# Patient Record
Sex: FEMALE | Race: BLACK OR AFRICAN AMERICAN
[De-identification: names, ages, dates, MRNs, and addresses within clinical notes are randomized per-mention and may not be internally consistent; named-entity substitution may affect disease eponyms.]

---

## 2018-09-12 ENCOUNTER — HOSPITAL ENCOUNTER (INPATIENT)
Dept: HOSPITAL 74 - JER | Age: 53
LOS: 1 days | Discharge: HOME | DRG: 58 | End: 2018-09-13
Attending: INTERNAL MEDICINE | Admitting: INTERNAL MEDICINE
Payer: COMMERCIAL

## 2018-09-12 VITALS — BODY MASS INDEX: 41.3 KG/M2

## 2018-09-12 DIAGNOSIS — I10: ICD-10-CM

## 2018-09-12 DIAGNOSIS — E66.9: ICD-10-CM

## 2018-09-12 DIAGNOSIS — R20.0: Primary | ICD-10-CM

## 2018-09-12 LAB
ALBUMIN SERPL-MCNC: 3.5 G/DL (ref 3.4–5)
ALP SERPL-CCNC: 52 U/L (ref 45–117)
ALT SERPL-CCNC: 35 U/L (ref 12–78)
ANION GAP SERPL CALC-SCNC: 5 MMOL/L (ref 8–16)
APPEARANCE UR: (no result)
AST SERPL-CCNC: 23 U/L (ref 15–37)
BASOPHILS # BLD: 0.9 % (ref 0–2)
BILIRUB SERPL-MCNC: 0.5 MG/DL (ref 0.2–1)
BILIRUB UR STRIP.AUTO-MCNC: NEGATIVE MG/DL
BUN SERPL-MCNC: 12 MG/DL (ref 7–18)
CALCIUM SERPL-MCNC: 8.8 MG/DL (ref 8.5–10.1)
CHLORIDE SERPL-SCNC: 107 MMOL/L (ref 98–107)
CHOLEST SERPL-MCNC: 176 MG/DL (ref 50–200)
CO2 SERPL-SCNC: 30 MMOL/L (ref 21–32)
COLOR UR: (no result)
CREAT SERPL-MCNC: 0.7 MG/DL (ref 0.55–1.02)
DEPRECATED RDW RBC AUTO: 14 % (ref 11.6–15.6)
EOSINOPHIL # BLD: 0.6 % (ref 0–4.5)
GLUCOSE SERPL-MCNC: 100 MG/DL (ref 74–106)
HCT VFR BLD CALC: 37.2 % (ref 32.4–45.2)
HDLC SERPL-MCNC: 39 MG/DL (ref 40–60)
HGB BLD-MCNC: 12.4 GM/DL (ref 10.7–15.3)
INR BLD: 1.12 (ref 0.83–1.09)
KETONES UR QL STRIP: NEGATIVE
LEUKOCYTE ESTERASE UR QL STRIP.AUTO: NEGATIVE
LYMPHOCYTES # BLD: 54 % (ref 8–40)
MCH RBC QN AUTO: 26.4 PG (ref 25.7–33.7)
MCHC RBC AUTO-ENTMCNC: 33.4 G/DL (ref 32–36)
MCV RBC: 78.9 FL (ref 80–96)
MONOCYTES # BLD AUTO: 6.8 % (ref 3.8–10.2)
NEUTROPHILS # BLD: 37.7 % (ref 42.8–82.8)
NITRITE UR QL STRIP: NEGATIVE
PH UR: 5 [PH] (ref 5–8)
PLATELET # BLD AUTO: 197 K/MM3 (ref 134–434)
PMV BLD: 7.8 FL (ref 7.5–11.1)
POTASSIUM SERPLBLD-SCNC: 3.8 MMOL/L (ref 3.5–5.1)
PROT SERPL-MCNC: 7.8 G/DL (ref 6.4–8.2)
PROT UR QL STRIP: NEGATIVE
PROT UR QL STRIP: NEGATIVE
PT PNL PPP: 12.7 SEC (ref 9.7–13)
RBC # BLD AUTO: 4.72 M/MM3 (ref 3.6–5.2)
SODIUM SERPL-SCNC: 142 MMOL/L (ref 136–145)
SP GR UR: 1.01 (ref 1–1.03)
TRIGL SERPL-MCNC: 283 MG/DL (ref 35–160)
UROBILINOGEN UR STRIP-MCNC: NEGATIVE MG/DL (ref 0.2–1)
WBC # BLD AUTO: 9.6 K/MM3 (ref 4–10)

## 2018-09-12 RX ADMIN — AMLODIPINE BESYLATE SCH MG: 10 TABLET ORAL at 23:30

## 2018-09-12 NOTE — PDOC
History of Present Illness





- General


Chief Complaint: Blood Pressure Problem


Stated Complaint: NUMBNESS LEFT SIDE FACE SINCE 9/11/18


Time Seen by Provider: 09/12/18 12:53





- History of Present Illness


Initial Comments: 





09/12/18 13:34


The patient is a 53 year old female, with a significant past medical history of 

HTN (not on medication), who presents to the emergency department with 2 days 

of constant left facial and intermittent L arm numbness. She states reports her 

blood pressure 2 days ago was 202/136 when she was visiting her family in 

[place]. She reportedly drove to [place] and back, but admits to taking stops 

and walking around on route. She reports the facial numbness is more prominent 

to her left forehead with slightly less decrease in sensation to her left cheek 

and jaw. She reports her left arm has pins and needles from her wrist to her 

anterior elbow. She denies any tingling now and reports the tingling comes and 

goes. Secondarily, she states she felt a momentary pressure-like headache at 

onset yesterday which has resolved at this time. 





The patient denies chest pain, shortness of breath, and dizziness. She denies 

visual, auditory, or speech changes. The patient denies fever, chills, nausea, 

vomit, diarrhea and constipation. The patient denies dysuria, frequency, 

urgency and hematuria. 





Allergies: NKDA


Past surgical history: tubal ligation


Social history: occasional ETOH. prior tobacco smoker (26 yrs ago)


Family Hx: CAD (mother), CHF (grandmother)


PCP - Dr. Arias? ([place])








Past History





- Past Medical History


Allergies/Adverse Reactions: 


 Allergies











Allergy/AdvReac Type Severity Reaction Status Date / Time


 


Penicillins Allergy   Verified 09/12/18 12:04











Home Medications: 


Ambulatory Orders





NK [No Known Home Medication]  09/12/18 








Cardiac Disorders: Yes (10% blockage)


CVA: No


COPD: No


HTN: Yes





- Surgical History


Abdominal Surgery: Yes (tubal ligation)





- Suicide/Smoking/Psychosocial Hx


Smoking History: Never smoked


Information on smoking cessation initiated: No


Hx Alcohol Use: No


Drug/Substance Use Hx: No


Substance Use Type: None





**Review of Systems





- Review of Systems


Comments:: 





09/12/18 13:30


GENERAL/CONSTITUTIONAL: No fever or chills. No weakness.


HEAD, EYES, EARS, NOSE AND THROAT: No change in vision. No ear pain or 

discharge. No sore throat.


GASTROINTESTINAL: No nausea, vomiting, diarrhea or constipation.


GENITOURINARY: No dysuria, frequency, or change in urination.


CARDIOVASCULAR: No chest pain or shortness of breath.


RESPIRATORY: No cough, wheezing, or hemoptysis.


MUSCULOSKELETAL: No joint or muscle swelling or pain. No neck or back pain.


SKIN: No rash


NEUROLOGIC: +headache, +L facial and LUE weakness, no vertigo, loss of 

consciousness, 


ENDOCRINE: No increased thirst. No abnormal weight change.


HEMATOLOGIC/LYMPHATIC: No anemia, easy bleeding, or history of blood clots.


ALLERGIC/IMMUNOLOGIC: No hives or skin allergy.





*Physical Exam





- Vital Signs


 Last Vital Signs











Temp Pulse Resp BP Pulse Ox


 


 98.2 F   74   19   195/105   99 


 


 09/12/18 12:01  09/12/18 12:01  09/12/18 12:01  09/12/18 12:01  09/12/18 12:01














- Physical Exam


Comments: 





09/12/18 13:33


GENERAL: Awake, alert, and fully oriented, in no acute distress


HEAD: No signs of trauma


EYES: PERRLA, EOMI, sclera anicteric, conjunctiva clear


ENT: Auricles normal inspection, hearing grossly normal, nares patent, 

oropharynx clear without exudates. Moist mucosa


NECK: Normal ROM, supple, no lymphadenopathy, JVD, or masses


LUNGS: Breath sounds equal, clear to auscultation bilaterally.  No wheezes, and 

no crackles


HEART: Regular rate and rhythm, normal S1 and S2, no murmurs, rubs or gallops


ABDOMEN: Soft, nontender, normoactive bowel sounds.  No guarding, no rebound.  

No masses


EXTREMITIES: Normal range of motion, no edema.  No clubbing or cyanosis. No 

cords, erythema, or tenderness


NEUROLOGICAL:  Normal speech, decreased L facial sensation to light touch, 

negative pronator drift, 5/5 strength in all 4 extremities, normal sensation to 

light touch in all 4 extremities, normal cerebellar exam, normal gait, normal 

reflexes and tone


SKIN: Warm, Dry, normal turgor, no rashes or lesions noted.





**Heart Score/ECG Review


  ** #1





09/12/18 13:24


Twelve-lead EKG was performed and reviewed by me. Normal sinus rhythm, rate 64. 

Normal axis and intervals. No ST elevations. T wave inversions in leads 1, 2, 3

, aVF, V5, V6. T-wave flattening in aVL. No previous EKG to compare.





ED Treatment Course





- LABORATORY


CBC & Chemistry Diagram: 


 09/12/18 14:14





 09/12/18 14:14





Medical Decision Making





- Medical Decision Making





09/12/18 13:25


53-year-old female with a history of hypertension, but not on any medications 

presents emergency Department with left facial numbness since last night as 

well as left upper extremity numbness intermittently since yesterday. Patient's 

blood pressures elevated in the ED to 195/105. On exam has decreased sensation 

over V1 and V2 distributions of the trigeminal nerve. Remainder of neuro exam 

is intact. Differential includes CVA versus TIA versus hypertensive emergency. 

Plan


-labs


-CTH


-neuro c/s


-will allow permissive HTN in case of CVA


-admit





09/12/18 15:27


Labs neg


CTH neg


MRI ordered, neuro c/s awaiting call back


Pt updated





09/12/18 16:38


Case discussed with Dr. Montero who recommends hydralzine to get BP down by 20-

25%


Case discussed with Dr. Boyd, pt accepted for admission





Case discussed in detail with admitting physician including history, physical 

exam and ancillary studies.





Admitting physician has assumed care for the patient, will follow all pending 

diagnostics and will complete the evaluation and treatment.  








*DC/Admit/Observation/Transfer


Diagnosis at time of Disposition: 


 Left facial numbness, Hypertension








- Discharge Dispostion


Condition at time of disposition: Stable


Decision to Admit order: Yes





- Referrals





- Patient Instructions





- Post Discharge Activity





- Attestations


Physician Attestion: 





09/12/18 15:29








I, Dr. Jayce Tidwell MD, attest that this document has been prepared under my 

direction and personally reviewed by me in its entirety.   I further attest, 

that it accurately reflects all work, treatment, procedures and medical decision

-making performed by me.

## 2018-09-13 VITALS — SYSTOLIC BLOOD PRESSURE: 135 MMHG | TEMPERATURE: 98 F | DIASTOLIC BLOOD PRESSURE: 75 MMHG

## 2018-09-13 VITALS — HEART RATE: 68 BPM

## 2018-09-13 LAB
ALBUMIN SERPL-MCNC: 3.4 G/DL (ref 3.4–5)
ALP SERPL-CCNC: 54 U/L (ref 45–117)
ALT SERPL-CCNC: 31 U/L (ref 13–61)
ANION GAP SERPL CALC-SCNC: 6 MMOL/L (ref 8–16)
ANISOCYTOSIS BLD QL: (no result)
AST SERPL-CCNC: 23 U/L (ref 15–37)
BASOPHILS # BLD: 0.4 % (ref 0–2)
BILIRUB SERPL-MCNC: 0.6 MG/DL (ref 0.2–1)
BUN SERPL-MCNC: 12 MG/DL (ref 7–18)
CALCIUM SERPL-MCNC: 8.7 MG/DL (ref 8.5–10.1)
CHLORIDE SERPL-SCNC: 105 MMOL/L (ref 98–107)
CO2 SERPL-SCNC: 30 MMOL/L (ref 21–32)
CREAT SERPL-MCNC: 0.8 MG/DL (ref 0.55–1.02)
DEPRECATED RDW RBC AUTO: 14.2 % (ref 11.6–15.6)
EOSINOPHIL # BLD: 1 % (ref 0–4.5)
GLUCOSE SERPL-MCNC: 102 MG/DL (ref 74–106)
HCT VFR BLD CALC: 38.1 % (ref 32.4–45.2)
HGB BLD-MCNC: 12.6 GM/DL (ref 10.7–15.3)
LYMPHOCYTES # BLD: 55.7 % (ref 8–40)
MACROCYTES BLD QL: 0
MCH RBC QN AUTO: 26 PG (ref 25.7–33.7)
MCHC RBC AUTO-ENTMCNC: 33.1 G/DL (ref 32–36)
MCV RBC: 78.4 FL (ref 80–96)
MONOCYTES # BLD AUTO: 8 % (ref 3.8–10.2)
NEUTROPHILS # BLD: 34.9 % (ref 42.8–82.8)
PLATELET # BLD AUTO: 188 K/MM3 (ref 134–434)
PLATELET BLD QL SMEAR: NORMAL
PMV BLD: 7.8 FL (ref 7.5–11.1)
POTASSIUM SERPLBLD-SCNC: 3.8 MMOL/L (ref 3.5–5.1)
PROT SERPL-MCNC: 7.7 G/DL (ref 6.4–8.2)
RBC # BLD AUTO: 4.85 M/MM3 (ref 3.6–5.2)
SODIUM SERPL-SCNC: 141 MMOL/L (ref 136–145)
WBC # BLD AUTO: 9.2 K/MM3 (ref 4–10)

## 2018-09-13 RX ADMIN — AMLODIPINE BESYLATE SCH MG: 10 TABLET ORAL at 09:30

## 2018-09-13 NOTE — CON.CARD
Consult


Consult Specialty:: Cardiology


Referred by:: Dr. Robledo


Reason for Consultation:: Severe hypertension





- History of Present Illness


Chief Complaint: Severe hypertension with facial numbness


History of Present Illness: 


53-year-old obese woman with a PMHx of hypertension, but not on medications 

admitted 9/12/2018 with severe hypertension. 





The patient presented to emergency department 9/12/2018 with left facial 

numbness as well as left upper extremity numbness intermittently for one day. 

Her BP  Patient's blood pressures elevated in the ED to 195/105. Her symptoms 

resolved after admission. CT-had and MRI 9/12/2018 were negative. 





ECG 9/12/2018 showed sinus with lateral T inversion. Echo 9/13/2018 revealed 

mild concentric LVH with normal systolic function. LVEF = 65-70%.  





She has been under stress due to family tragedies. But she has no symptoms of 

angina or CHF. Her exercise tolerance is very good. 








- History Source


History Provided By: Patient


Limitations to Obtaining History: No Limitations





- Past Medical History


...LMP Comment: post menopausal


...Pregnant: No





- Alcohol/Substance Use


Hx Alcohol Use: Yes (occasional glass of wine)





- Smoking History


Smoking history: Never smoked


Have you smoked in the past 12 months: No





Home Medications





- Allergies


Allergies/Adverse Reactions: 


 Allergies











Allergy/AdvReac Type Severity Reaction Status Date / Time


 


Penicillins Allergy   Verified 09/12/18 12:04














- Home Medications


Home Medications: 


Ambulatory Orders





Naproxen Sodium [Aleve] 220 mg PO PRN  mg 09/13/18 











Review of Systems





- Review of Systems


Constitutional: reports: No Symptoms


Eyes: reports: No Symptoms


HENT: reports: No Symptoms


Neck: reports: No Symptoms


Cardiovascular: reports: No Symptoms


Respiratory: reports: No Symptoms


Gastrointestinal: reports: No Symptoms


Genitourinary: reports: No Symptoms


Breasts: reports: No Symptoms Reported


Musculoskeletal: reports: No Symptoms


Integumentary: reports: No Symptoms


Neurological: reports: No Symptoms


Endocrine: reports: No Symptoms


Hematology/Lymphatic: reports: No Symptoms


Vital Signs: 


 Vital Signs











Temperature  98.4 F   09/13/18 14:00


 


Pulse Rate  68   09/13/18 14:00


 


Respiratory Rate  18   09/13/18 14:00


 


Blood Pressure  122/83   09/13/18 14:00


 


O2 Sat by Pulse Oximetry (%)  98   09/13/18 09:00








General:  Well developed. Obese. No acute distress. 


Head: Normocephalic. Atraumatic, 


Eyes: PERRLA, EOMI. Sclerae anicteric. Conjunctivae clear.


Neck: Supple. No JVD. No bruits. 


Heart: Normal S1, S2: Regularly regular rhythm and rate. No murmur. No gallop 

or rub. 


Lungs: Symmetrical air entry. Clear to auscultation. No crackle. No wheezing or 

rhonchi.  


Abdomen: Soft. Bowel sound positive. Non tender. No masses. 


Extremities: No edema. No clubbing or cyanosis. PD 2+, equal bilaterally.


Neuro: Intact, no focal findings. AAO X3.





- Other Data


Labs, Other Data: 


 CBC, BMP





 09/13/18 06:00 





 09/13/18 06:00 





 INR, PTT











INR  1.12  (0.83-1.09)  H  09/12/18  14:14    








 Troponin, BNP











  09/12/18





  01:00


 


Troponin I  < 0.02








 Troponin, BNP











  09/12/18





  01:00


 


Troponin I  < 0.02














Assessment/Plan


53-year-old obese woman with a PMHx of hypertension, but not on medications 

admitted 9/12/2018 with severe hypertension. 





CT-had and MRI 9/12/2018 were negative. 


ECG 9/12/2018 showed sinus with lateral T inversion. Echo 9/13/2018 revealed 

mild concentric LVH with normal systolic function. LVEF = 65-70%.  





Severe hypertension:


BP is well controlled with amlodipine 10 mg daily. Her heart rate is low normal 

range without arrhythmia. 





Continue amlodipine 10 mg daily with low salt diet. 


Out-patient cardiac follow up with Dr. Fowler. 





Please call us for reconsult as needed.

## 2018-09-13 NOTE — ECHO
______________________________________________________________________________



Name: SERENITY, HARI                                   Exam:Adult Echocardiogram

MRN: [id number]         Study Date: 2018 08:37 AM

Age: 53 yrs

______________________________________________________________________________



Reason For Study: htn

Height: 64 in        Weight: 240 lb        BSA: 2.1 m2



______________________________________________________________________________



MMode/2D Measurements & Calculations

IVSd: 1.2 cm                                           Ao root diam: 3.2 cm

LVIDd: 4.0 cm                                          LA dimension: 4.0 cm

LVIDs: 2.4 cm

LVPWd: 1.4 cm



________________________________________________________

LVPWs: 2.0 cm                                          EDV(Teich): 71.1 ml

                                                       ESV(Teich): 19.7 ml



________________________________________________________

RV S Niko: 8.5 cm/sec



Doppler Measurements & Calculations

MV E max niko: 81.0 cm/sec

MV A max niko: 77.1 cm/sec                           MV dec slope: 138.0 cm/sec2

MV E/A: 1.1

MV dec time: 0.21 sec



_____________________________________________________

Ao V2 max: 163.8 cm/sec                             LV V1 max P.4 mmHg

Ao max PG: 10.8 mmHg                                LV V1 max: 116.4 cm/sec



_____________________________________________________

PA V2 max: 104.5 cm/sec                             Med Peak E' Niko: 6.9 cm/sec

PA max P.4 mmHg                                 Med E/e': 11.8

                                                    Lat Peak E' Niko: 10.7 cm/sec

                                                    Lat E/e': 7.6





______________________________________________________________________________

Procedure

A complete two-dimensional transthoracic echocardiogram was performed (2D, M-mode, Doppler and color 
flow

Doppler).

Left Ventricle

There is mild concentric left ventricular hypertrophy. The left ventricular ejection fraction is norm
al.

Ejection Fraction = 65-70%. Left Ventricular Filling pattern is normal for age. The left ventricular 
wall

motion is normal.

Right Ventricle

The right ventricle is normal in size and function.

Atria

Normal left and right atrial size and function.

Mitral Valve

There is no mitral regurgitation noted.

Tricuspid Valve

No tricuspid regurgitation. There was insufficient TR detected to calculate RV systolic pressure.

Aortic Valve

No hemodynamically significant valvular aortic stenosis. No aortic regurgitation is present.

Pulmonic Valve

Trace pulmonic valvular regurgitation.

Great Vessels

The aortic root is normal size.

Pericardium/Pleura

There is no pericardial effusion.

______________________________________________________________________________





Interpretation Summary

There is mild concentric left ventricular hypertrophy.

The left ventricular ejection fraction is normal.

The right ventricle is normal in size and function.

Trace pulmonic valvular regurgitation.





MD Christophe Musa 2018 02:39 PM

## 2018-09-13 NOTE — EKG
Test Reason : 

Blood Pressure : ***/*** mmHG

Vent. Rate : 064 BPM     Atrial Rate : 064 BPM

   P-R Int : 168 ms          QRS Dur : 078 ms

    QT Int : 428 ms       P-R-T Axes : 033 011 -13 degrees

   QTc Int : 441 ms

 

NORMAL SINUS RHYTHM

NONSPECIFIC T WAVE ABNORMALITY

ABNORMAL ECG

NO PREVIOUS ECGS AVAILABLE

Confirmed by CHIQUITA GREEN, FELIZ ([id number]) on 9/13/2018 3:40:37 PM

 

Referred By:             Confirmed By:FELIZ PORRAS MD

## 2018-09-13 NOTE — DS
Physical Examination


Vital Signs: 


 Vital Signs











Temperature  98.0 F   09/13/18 18:00


 


Pulse Rate  68   09/13/18 18:00


 


Respiratory Rate  18   09/13/18 18:00


 


Blood Pressure  135/75   09/13/18 18:00


 


O2 Sat by Pulse Oximetry (%)  98   09/13/18 09:00











Constitutional: Yes: No Distress


HENT: Yes: Atraumatic


Neck: Yes: Supple


Cardiovascular: Yes: Regular Rate and Rhythm


Respiratory: Yes: CTA Bilaterally


Gastrointestinal: Yes: Normal Bowel Sounds


Extremities: Yes: WNL


Neurological: Yes: Alert, Oriented


Labs: 


 CBC, BMP





 09/13/18 06:00 





 09/13/18 06:00 











Discharge Summary


Reason For Visit: LEFT FACIAL NUMBNESS, HYPERTENSION


Current Active Problems





Hypertension (Acute)


Left facial numbness (Acute)








Condition: Stable





- Instructions


Referrals: 


Hunter Fowler MD [Staff Physician] - 





- Home Medications


Comprehensive Discharge Medication List: 


Ambulatory Orders





Amlodipine Besylate [Norvasc -] 10 mg PO DAILY #30 tablet 09/13/18 


Naproxen Sodium [Aleve] 220 mg PO PRN  mg 09/13/18 





dc home

## 2018-09-13 NOTE — CONSULT
Consult - text type





- Consultation


Consultation Note: 





 Neurology


History of Present Illness


53 year old female, with a significant past medical history of HTN (not on 

medication), who presents to the emergency department with 2 days of constant 

left facial and intermittent L arm numbness. She stated her blood pressure was 

recently elevated to 202/136 when she was visiting her family in [place]. She 

reportedly drove to [place] and back, but admited to taking stops and walking 

around on route. She reported the facial numbness was more prominent to her 

left forehead with slightly less decrease in sensation to her left cheek and 

jaw. She reports her left arm has pins and needles from her wrist to her 

anterior elbow. She completed CT head and did not show acute changes. MRI brain 

completed and reviewed and no acute changes noted. No symptoms this am and 

reports being at baseline. No deficits noted. 








Past History





- Past Medical History


Allergies/Adverse Reactions: 


 Allergies











Allergy/AdvReac Type Severity Reaction Status Date / Time


 


Penicillins Allergy   Verified 09/12/18 12:04











Home Medications: 


Ambulatory Orders





NK [No Known Home Medication]  09/12/18 








Cardiac Disorders: Yes (10% blockage)


CVA: No


COPD: No


HTN: Yes





- Surgical History


Abdominal Surgery: Yes (tubal ligation)





- Suicide/Smoking/Psychosocial Hx


Smoking History: Never smoked


Information on smoking cessation initiated: No


Hx Alcohol Use: No


Drug/Substance Use Hx: No


Substance Use Type: None





**Review of Systems


GENERAL/CONSTITUTIONAL: No fever or chills. No weakness.


HEAD, EYES, EARS, NOSE AND THROAT: No change in vision. No ear pain or 

discharge. No sore throat.


GASTROINTESTINAL: No nausea, vomiting, diarrhea or constipation.


GENITOURINARY: No dysuria, frequency, or change in urination.


CARDIOVASCULAR: No chest pain or shortness of breath.


RESPIRATORY: No cough, wheezing, or hemoptysis.


MUSCULOSKELETAL: No joint or muscle swelling or pain. No neck or back pain.


SKIN: No rash


NEUROLOGIC: +headache, +L facial and LUE weakness, no vertigo, loss of 

consciousness, 


ENDOCRINE: No increased thirst. No abnormal weight change.


HEMATOLOGIC/LYMPHATIC: No anemia, easy bleeding, or history of blood clots.


ALLERGIC/IMMUNOLOGIC: No hives or skin allergy.





*Physical Exam


 Vital Signs











Temperature  98.3 F   09/13/18 05:46


 


Pulse Rate  61   09/13/18 05:46


 


Respiratory Rate  20   09/13/18 05:46


 


Blood Pressure  142/96   09/13/18 05:46


 


O2 Sat by Pulse Oximetry (%)  98   09/13/18 00:10














09/12/18 13:33


GENERAL: Awake, alert, and fully oriented, in no acute distress


HEAD: No signs of trauma


EYES: PERRLA, EOMI, sclera anicteric, conjunctiva clear


ENT: Auricles normal inspection, hearing grossly normal, nares patent, 

oropharynx clear without exudates. Moist mucosa


NECK: Normal ROM, supple, no lymphadenopathy, JVD, or masses


LUNGS: Breath sounds equal, clear to auscultation bilaterally.  No wheezes, and 

no crackles


HEART: Regular rate and rhythm, normal S1 and S2, no murmurs, rubs or gallops


ABDOMEN: Soft, nontender, normoactive bowel sounds.  No guarding, no rebound.  

No masses


EXTREMITIES: Normal range of motion, no edema.  No clubbing or cyanosis. No 

cords, erythema, or tenderness


NEUROLOGICAL:  Normal speech, CN normal, negative pronator drift, 5/5 strength 

in all 4 extremities, normal sensation to light touch in all 4 extremities, 

normal cerebellar exam, normal gait, normal reflexes and tone


SKIN: Warm, Dry, normal turgor, no rashes or lesions noted.


 CBCD











WBC  9.2 K/mm3 (4.0-10.0)   09/13/18  06:00    


 


RBC  4.85 M/mm3 (3.60-5.2)   09/13/18  06:00    


 


Hgb  12.6 GM/dL (10.7-15.3)   09/13/18  06:00    


 


Hct  38.1 % (32.4-45.2)   09/13/18  06:00    


 


MCV  78.4 fl (80-96)  L  09/13/18  06:00    


 


MCHC  33.1 g/dl (32.0-36.0)   09/13/18  06:00    


 


RDW  14.2 % (11.6-15.6)   09/13/18  06:00    


 


Plt Count  188 K/MM3 (134-434)   09/13/18  06:00    


 


MPV  7.8 fl (7.5-11.1)   09/13/18  06:00    








 CMP











Sodium  141 mmol/L (136-145)   09/13/18  06:00    


 


Potassium  3.8 mmol/L (3.5-5.1)   09/13/18  06:00    


 


Chloride  105 mmol/L ()   09/13/18  06:00    


 


Carbon Dioxide  30 mmol/L (21-32)   09/13/18  06:00    


 


Anion Gap  6 MMOL/L (8-16)  L  09/13/18  06:00    


 


BUN  12 mg/dL (7-18)   09/13/18  06:00    


 


Creatinine  0.8 mg/dL (0.55-1.02)   09/13/18  06:00    


 


Creat Clearance w eGFR  > 60  (>60)   09/13/18  06:00    


 


Random Glucose  102 mg/dL ()   09/13/18  06:00    


 


Calcium  8.7 mg/dL (8.5-10.1)   09/13/18  06:00    


 


Total Bilirubin  0.6 mg/dL (0.2-1.0)   09/13/18  06:00    


 


AST  23 U/L (15-37)   09/13/18  06:00    


 


ALT  31 U/L (13-61)   09/13/18  06:00    


 


Alkaline Phosphatase  54 U/L ()   09/13/18  06:00    


 


Total Protein  7.7 g/dl (6.4-8.2)   09/13/18  06:00    


 


Albumin  3.4 g/dl (3.4-5.0)   09/13/18  06:00    








 CARDIAC ENZYMES











Creatine Kinase  48 IU/L ()   09/12/18  14:14    


 


Troponin I  < 0.02 ng/ml (0.00-0.05)   09/12/18  14:14    











CT head reviewed


MRI brain reviewed 





Medical Decision Making


53 year old female, with a significant past medical history of HTN (not on 

medication), who presents to the emergency department with 2 days of constant 

left facial and intermittent L arm numbness. She stated her blood pressure was 

recently elevated to 202/136 when she was visiting her family in [place]. She 

reportedly drove to [place] and back, but admited to taking stops and walking 

around on route. She reported the facial numbness was more prominent to her 

left forehead with slightly less decrease in sensation to her left cheek and 

jaw. She reports her left arm has pins and needles from her wrist to her 

anterior elbow. She completed CT head and did not show acute changes. MRI brain 

completed and reviewed and no acute changes noted. No symptoms this am and 

reports being at baseline. No deficits noted. Blood pressure optmization 

recommended. , would not require statin at this time. Weight reduction 

recommended for obesity, diet modification, increased exercise. Medication 

compliance and regular follow ups recommended with PCP.